# Patient Record
Sex: MALE | Race: WHITE | Employment: FULL TIME | URBAN - METROPOLITAN AREA
[De-identification: names, ages, dates, MRNs, and addresses within clinical notes are randomized per-mention and may not be internally consistent; named-entity substitution may affect disease eponyms.]

---

## 2018-01-09 ENCOUNTER — OFFICE VISIT (OUTPATIENT)
Dept: FAMILY MEDICINE CLINIC | Facility: CLINIC | Age: 38
End: 2018-01-09

## 2018-01-09 VITALS
BODY MASS INDEX: 31.2 KG/M2 | TEMPERATURE: 98 F | WEIGHT: 185 LBS | HEART RATE: 103 BPM | OXYGEN SATURATION: 98 % | HEIGHT: 64.5 IN | SYSTOLIC BLOOD PRESSURE: 116 MMHG | DIASTOLIC BLOOD PRESSURE: 70 MMHG

## 2018-01-09 DIAGNOSIS — J01.10 ACUTE NON-RECURRENT FRONTAL SINUSITIS: Primary | ICD-10-CM

## 2018-01-09 PROCEDURE — 99202 OFFICE O/P NEW SF 15 MIN: CPT

## 2018-01-09 RX ORDER — AMOXICILLIN AND CLAVULANATE POTASSIUM 875; 125 MG/1; MG/1
1 TABLET, FILM COATED ORAL 2 TIMES DAILY
Qty: 20 TABLET | Refills: 0 | Status: SHIPPED | OUTPATIENT
Start: 2018-01-09 | End: 2018-01-19

## 2018-01-09 NOTE — PROGRESS NOTES
HPI:    Patient ID: Jess Paulino is a 42year old male. Cough   This is a new problem. Episode onset: 2 days ago. The problem has been gradually worsening. The problem occurs every few minutes. The cough is productive of sputum.  Associated sympt Hearing, tympanic membrane, external ear and ear canal normal.   Nose: Mucosal edema and rhinorrhea present. Right sinus exhibits frontal sinus tenderness. Right sinus exhibits no maxillary sinus tenderness. Left sinus exhibits frontal sinus tenderness.  [facility]

## 2018-01-10 NOTE — PATIENT INSTRUCTIONS
Sinusitis [Sinusitis, Abx Tx]    Los senos paranasales son cavidades llenas de aire dentro de los huesos de la fausto. Se conectan con la parte interna de essie Downey.  La sinusitis es mady inflamación del tejido que recubre la cavidad del seno paranasal. Jerrod i 5. Puede anthony algún descongestionante sin receta a menos que le hayan recetado algún medicamento similar. Los sprays nasales son los que tienen [address] rápido.  Use alguno que contenga fenilefrina (Joe-Synephrine, Sinex, entre otros) o oximetazolina (Af 8. Puede usar acetaminofén (Tylenol) o ibuprofeno (Motrin o Advil) para controlar el dolor, a menos que le hayan recetado otro medicamento.  [ Aime Morejon: Si tiene mady enfermedad hepática o renal crónica, o ha tenido alguna vez mady úlcera estomacal, consulte con

## 2018-02-06 ENCOUNTER — OFFICE VISIT (OUTPATIENT)
Dept: FAMILY MEDICINE CLINIC | Facility: CLINIC | Age: 38
End: 2018-02-06

## 2018-02-06 VITALS
HEART RATE: 83 BPM | BODY MASS INDEX: 32.72 KG/M2 | WEIGHT: 187 LBS | OXYGEN SATURATION: 99 % | HEIGHT: 63.5 IN | SYSTOLIC BLOOD PRESSURE: 120 MMHG | DIASTOLIC BLOOD PRESSURE: 80 MMHG

## 2018-02-06 DIAGNOSIS — Z00.01 ENCOUNTER FOR ROUTINE ADULT HEALTH EXAMINATION WITH ABNORMAL FINDINGS: Primary | ICD-10-CM

## 2018-02-06 DIAGNOSIS — E66.09 NON MORBID OBESITY DUE TO EXCESS CALORIES: ICD-10-CM

## 2018-02-06 DIAGNOSIS — Z13.31 DEPRESSION SCREENING: ICD-10-CM

## 2018-02-06 PROBLEM — J01.10 ACUTE NON-RECURRENT FRONTAL SINUSITIS: Status: RESOLVED | Noted: 2018-01-09 | Resolved: 2018-02-06

## 2018-02-06 PROCEDURE — 99395 PREV VISIT EST AGE 18-39: CPT

## 2018-02-07 ENCOUNTER — NURSE ONLY (OUTPATIENT)
Dept: FAMILY MEDICINE CLINIC | Facility: CLINIC | Age: 38
End: 2018-02-07

## 2018-02-07 DIAGNOSIS — Z00.01 ENCOUNTER FOR ROUTINE ADULT HEALTH EXAMINATION WITH ABNORMAL FINDINGS: ICD-10-CM

## 2018-02-07 DIAGNOSIS — E78.2 MIXED HYPERLIPIDEMIA: Primary | ICD-10-CM

## 2018-02-07 LAB
ALBUMIN SERPL BCP-MCNC: 4.4 G/DL (ref 3.5–4.8)
ALBUMIN/GLOB SERPL: 1.6 {RATIO} (ref 1–2)
ALP SERPL-CCNC: 58 U/L (ref 32–100)
ALT SERPL-CCNC: 44 U/L (ref 17–63)
ANION GAP SERPL CALC-SCNC: 9 MMOL/L (ref 0–18)
AST SERPL-CCNC: 28 U/L (ref 15–41)
BILIRUB SERPL-MCNC: 0.5 MG/DL (ref 0.3–1.2)
BUN SERPL-MCNC: 15 MG/DL (ref 8–20)
BUN/CREAT SERPL: 16.5 (ref 10–20)
CALCIUM SERPL-MCNC: 9.3 MG/DL (ref 8.5–10.5)
CHLORIDE SERPL-SCNC: 104 MMOL/L (ref 95–110)
CHOLEST SERPL-MCNC: 216 MG/DL (ref 110–200)
CO2 SERPL-SCNC: 27 MMOL/L (ref 22–32)
CREAT SERPL-MCNC: 0.91 MG/DL (ref 0.5–1.5)
ERYTHROCYTE [DISTWIDTH] IN BLOOD BY AUTOMATED COUNT: 13.2 % (ref 11–15)
GLOBULIN PLAS-MCNC: 2.7 G/DL (ref 2.5–3.7)
GLUCOSE SERPL-MCNC: 84 MG/DL (ref 70–99)
HCT VFR BLD AUTO: 46.4 % (ref 41–52)
HDLC SERPL-MCNC: 45 MG/DL
HGB BLD-MCNC: 15.4 G/DL (ref 13.5–17.5)
LDLC SERPL CALC-MCNC: 121 MG/DL (ref 0–99)
MCH RBC QN AUTO: 30.6 PG (ref 27–32)
MCHC RBC AUTO-ENTMCNC: 33.2 G/DL (ref 32–37)
MCV RBC AUTO: 92 FL (ref 80–100)
NONHDLC SERPL-MCNC: 171 MG/DL
OSMOLALITY UR CALC.SUM OF ELEC: 290 MOSM/KG (ref 275–295)
PATIENT FASTING: YES
PLATELET # BLD AUTO: 225 K/UL (ref 140–400)
PMV BLD AUTO: 9.8 FL (ref 7.4–10.3)
POTASSIUM SERPL-SCNC: 4.3 MMOL/L (ref 3.3–5.1)
PROT SERPL-MCNC: 7.1 G/DL (ref 5.9–8.4)
RBC # BLD AUTO: 5.05 M/UL (ref 4.5–5.9)
SODIUM SERPL-SCNC: 140 MMOL/L (ref 136–144)
TRIGL SERPL-MCNC: 250 MG/DL (ref 1–149)
WBC # BLD AUTO: 4.2 K/UL (ref 4–11)

## 2018-02-07 PROCEDURE — 85027 COMPLETE CBC AUTOMATED: CPT

## 2018-02-07 PROCEDURE — 80053 COMPREHEN METABOLIC PANEL: CPT

## 2018-02-07 PROCEDURE — 80061 LIPID PANEL: CPT

## 2018-02-07 PROCEDURE — 36415 COLL VENOUS BLD VENIPUNCTURE: CPT

## 2018-02-07 RX ORDER — SIMVASTATIN 20 MG
20 TABLET ORAL NIGHTLY
Qty: 90 TABLET | Refills: 0 | Status: SHIPPED | OUTPATIENT
Start: 2018-02-07 | End: 2018-05-22

## 2018-02-07 NOTE — PROGRESS NOTES
CC: Annual Physical Exam     HPI:   Derick Chacon is a 42year old male who presents for a complete physical exam. Pt denies any acute complaints at this time.     Wt Readings from Last 6 Encounters:  02/06/18 : 187 lb  01/09/18 : 185 lb    Body mass bleeding or bruising. LYMPHATICS:  Denies enlarged nodes or history of splenectomy. PSYCHIATRIC:  Denies depression or anxiety. ENDOCRINOLOGIC:  Denies excessive sweating, cold or heat intolerance, polyuria or polydipsia.   ALLERGIES:  Denies allergic re check:   Orders Placed This Encounter      CBC, Platelet, No Differential [E]      Comp Metabolic Panel (14) [E]      Lipid Panel [E]      Urinalysis, Routine [E][If pt <2 yrs old, must also order Reducing Substance ([id number])]    1.  Encounter for routine a

## 2018-02-07 NOTE — PATIENT INSTRUCTIONS
Pautas de prevención, hombres de entre 18 y 44 años de edad  Las pruebas de detección y las vacunas son importantes para el manejo de whelan andrews.  La consejería sobre whelan andrews también es esencial. A continuación, verá pautas en relación con esos temas para [employer] hombres de neva ajith de edad Cada anmol a bert años si no existen factores de riesgo que predispongan a tener enfermedades de los ojos   Assunta Fray   Varicela Todos los hombres de neva ajith de edad que no tienen registro d PCV13: mady dosis [facility] 19 y 72 años de edad (protege contra 13 tipos de bacteria neumocócica)    PPSV23: Cheryle Lineman a dos dosis [employer] 59 años de [place], o mady dosis a partir de los 65 años (protege contra 23 tipos de bacteria neumocócica)   Refuerzo de téta

## 2018-02-08 ENCOUNTER — TELEPHONE (OUTPATIENT)
Dept: FAMILY MEDICINE CLINIC | Facility: CLINIC | Age: 38
End: 2018-02-08

## 2018-02-08 NOTE — TELEPHONE ENCOUNTER
----- Message from Katarzyna Chandler MD sent at 2/7/2018 10:09 PM CST -----  Start Simvastatin 20mg PO QHS, medication e-prescribed; needs repeat BW (CMP, lipid panel) in 3 months prior to next f/u appointment; ok to file.

## 2018-02-08 NOTE — TELEPHONE ENCOUNTER
Patient's wife got informed about his lab results and will let Edenilson Millard know about them and made her aware he has to return in 3 months to repeat labs.

## 2018-05-16 ENCOUNTER — NURSE ONLY (OUTPATIENT)
Dept: FAMILY MEDICINE CLINIC | Facility: CLINIC | Age: 38
End: 2018-05-16

## 2018-05-16 DIAGNOSIS — E78.2 MIXED HYPERLIPIDEMIA: ICD-10-CM

## 2018-05-16 PROCEDURE — 80061 LIPID PANEL: CPT

## 2018-05-16 PROCEDURE — 36415 COLL VENOUS BLD VENIPUNCTURE: CPT

## 2018-05-16 PROCEDURE — 80053 COMPREHEN METABOLIC PANEL: CPT

## 2018-05-16 NOTE — PROGRESS NOTES
Patient presented to clinic for lipid blood work. Orders verified in patient chart. Name and  verified. Patient is fasting. Blood drawn from the left antecubital, tolerated well without complications.

## 2018-05-22 ENCOUNTER — OFFICE VISIT (OUTPATIENT)
Dept: FAMILY MEDICINE CLINIC | Facility: CLINIC | Age: 38
End: 2018-05-22

## 2018-05-22 VITALS
HEART RATE: 71 BPM | WEIGHT: 184 LBS | OXYGEN SATURATION: 98 % | BODY MASS INDEX: 32 KG/M2 | SYSTOLIC BLOOD PRESSURE: 108 MMHG | DIASTOLIC BLOOD PRESSURE: 80 MMHG

## 2018-05-22 DIAGNOSIS — E78.2 MIXED HYPERLIPIDEMIA: Primary | ICD-10-CM

## 2018-05-22 PROBLEM — Z00.01 ENCOUNTER FOR ROUTINE ADULT HEALTH EXAMINATION WITH ABNORMAL FINDINGS: Status: RESOLVED | Noted: 2018-02-06 | Resolved: 2018-05-22

## 2018-05-22 PROCEDURE — 99213 OFFICE O/P EST LOW 20 MIN: CPT

## 2018-05-22 RX ORDER — SIMVASTATIN 40 MG
40 TABLET ORAL NIGHTLY
Qty: 90 TABLET | Refills: 0 | Status: SHIPPED | OUTPATIENT
Start: 2018-05-22 | End: 2018-07-16

## 2018-05-22 NOTE — PROGRESS NOTES
HPI:    Patient ID: Ishan Stone is a 42year old male. Hyperlipidemia   This is a chronic problem. Episode onset: few months ago. The problem is uncontrolled. Exacerbating diseases include obesity.  Factors aggravating his hyperlipidemia include oriented to person, place, and time. Skin: Skin is warm. No rash noted. Nursing note and vitals reviewed. ASSESSMENT/PLAN:   1. Mixed hyperlipidemia  Pt reassured and all questions answered.   Clinical course, prognosis, and treatment option

## 2018-05-22 NOTE — PATIENT INSTRUCTIONS
Colesterol Elevado [High Cholesterol]  La grasa de los alimentos y el colesterol no son la misma cosa. Es importante entender cómo la grasa de los alimentos afecta los niveles de [place].   El cuerpo necesita colesterol para producir células nuevas y c · [facility] que son las grasas saturadas y las grasas trans. Estas aumentan el riesgo de enfermedad. Éstas disminuyen el nivel del colesterol [facility] y [place] el nivel del colesterol [facility].  Las grasas malas se encuentran en las [facility] p 4. Un dietista certificado puede ser útil para enseñarle de qué modo planear las comidas y cómo ajustar whelan alimentación para seguir neva tipo de dieta. Puede pedirle a whelan médico que lo remita.   5. Aumente gradualmente whelan ejercicio diario [employer] © 7302-9876 [address]. [address] [facility] [place] [facility], [facility] [facility]. Todos los derechos reservados. Esta información no pretende sustituir la atención médica profesional. Sólo whelan médico puede diagnosticar y tratar un problema de andrews.

## 2018-07-16 ENCOUNTER — OFFICE VISIT (OUTPATIENT)
Dept: FAMILY MEDICINE CLINIC | Facility: CLINIC | Age: 38
End: 2018-07-16

## 2018-07-16 VITALS
OXYGEN SATURATION: 98 % | DIASTOLIC BLOOD PRESSURE: 80 MMHG | HEART RATE: 60 BPM | WEIGHT: 185 LBS | SYSTOLIC BLOOD PRESSURE: 108 MMHG | BODY MASS INDEX: 32 KG/M2

## 2018-07-16 DIAGNOSIS — R10.10 PAIN OF UPPER ABDOMEN: Primary | ICD-10-CM

## 2018-07-16 DIAGNOSIS — E78.2 MIXED HYPERLIPIDEMIA: ICD-10-CM

## 2018-07-16 DIAGNOSIS — L30.9 DERMATITIS: ICD-10-CM

## 2018-07-16 PROCEDURE — 99213 OFFICE O/P EST LOW 20 MIN: CPT

## 2018-07-16 RX ORDER — ATORVASTATIN CALCIUM 20 MG/1
20 TABLET, FILM COATED ORAL NIGHTLY
Qty: 90 TABLET | Refills: 0 | Status: SHIPPED | OUTPATIENT
Start: 2018-07-16 | End: 2019-08-20

## 2018-07-16 RX ORDER — TRIAMCINOLONE ACETONIDE 0.25 MG/G
1 CREAM TOPICAL 2 TIMES DAILY
Qty: 30 G | Refills: 1 | Status: SHIPPED | OUTPATIENT
Start: 2018-07-16 | End: 2019-08-20 | Stop reason: ALTCHOICE

## 2018-07-17 NOTE — PROGRESS NOTES
HPI:    Patient ID: Martita Egan is a 34year old male. Abdominal Pain   This is a new problem. Episode onset: ~2 months ago since starting to take higher dose of Simvastatin. The onset quality is sudden. The problem occurs daily.  The problem ha Constitutional: He is oriented to person, place, and time. He appears well-developed and well-nourished. No distress. Cardiovascular: Normal rate, regular rhythm and normal heart sounds.     Pulmonary/Chest: Effort normal and breath sounds normal. No re

## 2018-07-17 NOTE — PATIENT INSTRUCTIONS
Medicamentos para el colesterol  Whelan proveedor de [employer] ha recetado medicamentos para ayudarlo a controlar el colesterol.  En esta hoja se explica cómo el colesterol afecta a whelan andrews y de qué forma los medicamentos pueden ayudarlo a mejorar s Usted puede determinar cuál es whelan nivel de lípidos mediante un análisis de [place]. Un nivel alto de colesterol en gauri es un factor de riesgo para desarrollar mady enfermedad cardiovascular aterosclerótica.  Hable con el proveedor de [employer] para d · Resinas (llamadas también secuestradores de ácidos biliares). Las resinas promueven un aumento de la eliminación del colesterol a través del intestino.  Funcionan uniéndose con la bilis (mady sustancia que ayuda al organismo a digerir la comida). Normalmen · Inhibidores de proproteína convertasa subtilisina kexina tipo 9 (PCSK9). Estos medicamentos reducen los niveles de colesterol LDL descomponiendo las áreas del hígado que controlan la produccción de colesterol LDL.  Estos medicamentos se administran median Planifique realizarse controles de colesterol con regularidad. Es posible que tenga que ayunar antes de hacerse estos controles. Marylen Maizes a whelan proveedor de [employer] cualquier efecto secundrio que puedan [facility].  Heber Chaves · Sepa cómo lidiar con los efectos secundarios.  Muchas personas, cuando comienzan a [employer], tienen efectos secundarios erika dolor de [place] y trastornos estomacales. Normalmente estos problemas desaparecen a las pocas semanas.  Informe a whelan · Limitar las veces que come en restaurantes o en locales de comida rápida  Actividad física  Whelan proveedor de atención médica podría recomendarle que aumente whelan actividad física si no mehta Jacobo-Kin.  Según cuál sea whelan situación, whelan proveedor podría r · Respiración profunda. Tómese unos minutos varias veces al día solo para sentarse tranquilo y respirar. Concéntrese en el aire que entre y sale de whelan cuerpo.   · Hablar con leanne seres queridos. Tómese unos minutos cada día para conectarse con las personas qu

## 2018-10-16 ENCOUNTER — TELEPHONE (OUTPATIENT)
Dept: FAMILY MEDICINE CLINIC | Facility: CLINIC | Age: 38
End: 2018-10-16

## 2019-08-20 ENCOUNTER — OFFICE VISIT (OUTPATIENT)
Dept: FAMILY MEDICINE CLINIC | Facility: CLINIC | Age: 39
End: 2019-08-20
Payer: COMMERCIAL

## 2019-08-20 VITALS
WEIGHT: 189 LBS | SYSTOLIC BLOOD PRESSURE: 104 MMHG | OXYGEN SATURATION: 96 % | BODY MASS INDEX: 33.07 KG/M2 | DIASTOLIC BLOOD PRESSURE: 70 MMHG | HEIGHT: 63.5 IN | HEART RATE: 74 BPM

## 2019-08-20 DIAGNOSIS — L30.9 DERMATITIS: ICD-10-CM

## 2019-08-20 DIAGNOSIS — M54.42 ACUTE LEFT-SIDED LOW BACK PAIN WITH LEFT-SIDED SCIATICA: Primary | ICD-10-CM

## 2019-08-20 PROBLEM — E66.09 CLASS 1 OBESITY DUE TO EXCESS CALORIES WITH SERIOUS COMORBIDITY AND BODY MASS INDEX (BMI) OF 32.0 TO 32.9 IN ADULT: Status: ACTIVE | Noted: 2018-02-06

## 2019-08-20 PROBLEM — Z13.31 DEPRESSION SCREENING: Status: RESOLVED | Noted: 2018-02-06 | Resolved: 2019-08-20

## 2019-08-20 PROCEDURE — 99213 OFFICE O/P EST LOW 20 MIN: CPT

## 2019-08-20 RX ORDER — CLOTRIMAZOLE AND BETAMETHASONE DIPROPIONATE 10; .64 MG/G; MG/G
1 CREAM TOPICAL 2 TIMES DAILY
Qty: 45 G | Refills: 1 | Status: SHIPPED | OUTPATIENT
Start: 2019-08-20 | End: 2020-07-20 | Stop reason: ALTCHOICE

## 2019-08-20 RX ORDER — METHYLPREDNISOLONE 4 MG/1
TABLET ORAL
Qty: 1 KIT | Refills: 0 | Status: SHIPPED | OUTPATIENT
Start: 2019-08-20 | End: 2019-12-26 | Stop reason: ALTCHOICE

## 2019-08-20 RX ORDER — NAPROXEN 500 MG/1
500 TABLET ORAL 2 TIMES DAILY WITH MEALS
Qty: 60 TABLET | Refills: 0 | Status: SHIPPED | OUTPATIENT
Start: 2019-08-20 | End: 2019-12-26 | Stop reason: ALTCHOICE

## 2019-08-20 RX ORDER — CYCLOBENZAPRINE HCL 10 MG
10 TABLET ORAL 3 TIMES DAILY PRN
Qty: 15 TABLET | Refills: 0 | Status: SHIPPED | OUTPATIENT
Start: 2019-08-20 | End: 2019-12-26 | Stop reason: ALTCHOICE

## 2019-08-20 NOTE — PROGRESS NOTES
HPI:    Patient ID: Jarett Aguilar is a 37year old male. Numbness   This is a new problem. Episode onset: 2 months ago. The problem occurs daily. The problem has been waxing and waning. Associated symptoms include myalgias and numbness.  Pertinent needed for Muscle spasms. Disp: 15 tablet Rfl: 0   clotrimazole-betamethasone 1-0.05 % External Cream Apply 1 Application topically 2 (two) times daily.  Disp: 45 g Rfl: 1     Allergies:No Known Allergies   PHYSICAL EXAM:   Physical Exam   Constitutional: H

## 2019-08-21 NOTE — PATIENT INSTRUCTIONS
Cuidado personal para el dolor en la espalda baja    Muchas personas tienen dolor en la espalda baja de vez en cuando. En muchos casos, no es un problema grave y el cuidado personal puede ayudar.  A veces, el dolor en la espalda baja puede ser un signo de · Cuando levante objetos, manténgalos cerca del cuerpo. Levante los objetos pesados con las piernas y no con la espalda. No trate de levantar más peso del que pueda. · Cuando se siente, mantenga la espalda baja apoyada sobre un respaldo.  Utilice mady toall

## 2019-12-26 ENCOUNTER — OFFICE VISIT (OUTPATIENT)
Dept: INTERNAL MEDICINE CLINIC | Facility: CLINIC | Age: 39
End: 2019-12-26
Payer: COMMERCIAL

## 2019-12-26 VITALS
SYSTOLIC BLOOD PRESSURE: 120 MMHG | HEART RATE: 67 BPM | OXYGEN SATURATION: 98 % | HEIGHT: 63.5 IN | RESPIRATION RATE: 17 BRPM | WEIGHT: 192.19 LBS | TEMPERATURE: 99 F | DIASTOLIC BLOOD PRESSURE: 82 MMHG | BODY MASS INDEX: 33.63 KG/M2

## 2019-12-26 DIAGNOSIS — Z00.00 ANNUAL PHYSICAL EXAM: ICD-10-CM

## 2019-12-26 DIAGNOSIS — M51.16 LUMBAR DISC DISEASE WITH RADICULOPATHY: Primary | ICD-10-CM

## 2019-12-26 DIAGNOSIS — E78.2 MIXED HYPERLIPIDEMIA: ICD-10-CM

## 2019-12-26 PROCEDURE — 99203 OFFICE O/P NEW LOW 30 MIN: CPT | Performed by: FAMILY MEDICINE

## 2019-12-26 RX ORDER — ATORVASTATIN CALCIUM 10 MG/1
10 TABLET, FILM COATED ORAL NIGHTLY
Qty: 90 TABLET | Refills: 3 | Status: SHIPPED | OUTPATIENT
Start: 2019-12-26 | End: 2020-03-05 | Stop reason: ALTCHOICE

## 2019-12-26 RX ORDER — DICLOFENAC SODIUM AND MISOPROSTOL 50; 200 MG/1; UG/1
1 TABLET, DELAYED RELEASE ORAL 2 TIMES DAILY PRN
Qty: 60 TABLET | Refills: 2 | Status: SHIPPED | OUTPATIENT
Start: 2019-12-26 | End: 2020-07-20

## 2019-12-26 RX ORDER — GABAPENTIN 300 MG/1
300 CAPSULE ORAL NIGHTLY
Qty: 90 CAPSULE | Refills: 0 | Status: SHIPPED | OUTPATIENT
Start: 2019-12-26 | End: 2020-07-20

## 2019-12-28 PROBLEM — M51.16 LUMBAR DISC DISEASE WITH RADICULOPATHY: Status: ACTIVE | Noted: 2019-12-28

## 2019-12-28 NOTE — PROGRESS NOTES
CC:  Leg Swelling (c/o lower back pain. (MRI) inflammed disk. )      Hx of CC:  H/O CHRONIC LOW BACK PAIN RADIATING TO LEFT BUTTOCKS/LEG AT TIMES X 5+ YEARS. SYMPTOMS FLUCTUATE IN INTENSITY.   AS PER FORMER MRI (BRIGHT LIGHT IMAGING), 2 HERNIATED LUMBAR DI Future  - LIPID PANEL;  Future    XR LUMBAR SPINE (MIN 2 VIEWS) (CPT=72100)  Orders Placed This Encounter      Comp Metabolic Panel (14) [E]          Standing Status: Future          Standing Expiration Date: 12/26/2020      Lipid Panel [E]          Standin

## 2020-02-05 ENCOUNTER — HOSPITAL ENCOUNTER (OUTPATIENT)
Dept: GENERAL RADIOLOGY | Facility: HOSPITAL | Age: 40
Discharge: HOME | End: 2020-02-05
Attending: FAMILY MEDICINE
Payer: COMMERCIAL

## 2020-02-05 DIAGNOSIS — M51.16 LUMBAR DISC DISEASE WITH RADICULOPATHY: ICD-10-CM

## 2020-02-05 PROCEDURE — 72110 X-RAY EXAM L-2 SPINE 4/>VWS: CPT | Performed by: FAMILY MEDICINE

## 2020-03-04 ENCOUNTER — NURSE ONLY (OUTPATIENT)
Dept: INTERNAL MEDICINE CLINIC | Facility: CLINIC | Age: 40
End: 2020-03-04
Payer: COMMERCIAL

## 2020-03-04 DIAGNOSIS — E78.2 MIXED HYPERLIPIDEMIA: ICD-10-CM

## 2020-03-04 DIAGNOSIS — Z00.00 ANNUAL PHYSICAL EXAM: ICD-10-CM

## 2020-03-04 LAB
ALBUMIN SERPL-MCNC: 4.3 G/DL (ref 3.4–5)
ALBUMIN/GLOB SERPL: 1.2 {RATIO} (ref 1–2)
ALP LIVER SERPL-CCNC: 80 U/L (ref 45–117)
ALT SERPL-CCNC: 67 U/L (ref 16–61)
ANION GAP SERPL CALC-SCNC: 8 MMOL/L (ref 0–18)
AST SERPL-CCNC: 24 U/L (ref 15–37)
BILIRUB SERPL-MCNC: 0.5 MG/DL (ref 0.1–2)
BUN BLD-MCNC: 20 MG/DL (ref 7–18)
BUN/CREAT SERPL: 20 (ref 10–20)
CALCIUM BLD-MCNC: 9.6 MG/DL (ref 8.5–10.1)
CHLORIDE SERPL-SCNC: 107 MMOL/L (ref 98–112)
CHOLEST SMN-MCNC: 248 MG/DL (ref ?–200)
CO2 SERPL-SCNC: 25 MMOL/L (ref 21–32)
CREAT BLD-MCNC: 1 MG/DL (ref 0.7–1.3)
GLOBULIN PLAS-MCNC: 3.7 G/DL (ref 2.8–4.4)
GLUCOSE BLD-MCNC: 83 MG/DL (ref 70–99)
HDLC SERPL-MCNC: 48 MG/DL (ref 40–59)
LDLC SERPL CALC-MCNC: 155 MG/DL (ref ?–100)
M PROTEIN MFR SERPL ELPH: 8 G/DL (ref 6.4–8.2)
NONHDLC SERPL-MCNC: 200 MG/DL (ref ?–130)
OSMOLALITY SERPL CALC.SUM OF ELEC: 292 MOSM/KG (ref 275–295)
PATIENT FASTING Y/N/NP: YES
PATIENT FASTING Y/N/NP: YES
POTASSIUM SERPL-SCNC: 4.2 MMOL/L (ref 3.5–5.1)
SODIUM SERPL-SCNC: 140 MMOL/L (ref 136–145)
TRIGL SERPL-MCNC: 223 MG/DL (ref 30–149)
VLDLC SERPL CALC-MCNC: 45 MG/DL (ref 0–30)

## 2020-03-04 PROCEDURE — 36415 COLL VENOUS BLD VENIPUNCTURE: CPT | Performed by: FAMILY MEDICINE

## 2020-03-04 PROCEDURE — 80053 COMPREHEN METABOLIC PANEL: CPT | Performed by: FAMILY MEDICINE

## 2020-03-04 PROCEDURE — 80061 LIPID PANEL: CPT | Performed by: FAMILY MEDICINE

## 2020-03-05 DIAGNOSIS — E78.2 MIXED HYPERLIPIDEMIA: Primary | ICD-10-CM

## 2020-03-05 RX ORDER — ROSUVASTATIN CALCIUM 10 MG/1
10 TABLET, COATED ORAL NIGHTLY
Qty: 90 TABLET | Refills: 3 | Status: SHIPPED | OUTPATIENT
Start: 2020-03-05 | End: 2020-09-28

## 2020-07-20 ENCOUNTER — OFFICE VISIT (OUTPATIENT)
Dept: INTERNAL MEDICINE CLINIC | Facility: CLINIC | Age: 40
End: 2020-07-20
Payer: COMMERCIAL

## 2020-07-20 VITALS
OXYGEN SATURATION: 99 % | BODY MASS INDEX: 33.49 KG/M2 | DIASTOLIC BLOOD PRESSURE: 72 MMHG | HEIGHT: 63.5 IN | TEMPERATURE: 97 F | WEIGHT: 191.38 LBS | SYSTOLIC BLOOD PRESSURE: 110 MMHG | HEART RATE: 83 BPM

## 2020-07-20 DIAGNOSIS — E78.2 MIXED HYPERLIPIDEMIA: ICD-10-CM

## 2020-07-20 DIAGNOSIS — G89.29 CHRONIC LEFT-SIDED LOW BACK PAIN WITH LEFT-SIDED SCIATICA: ICD-10-CM

## 2020-07-20 DIAGNOSIS — L20.89 OTHER ATOPIC DERMATITIS: ICD-10-CM

## 2020-07-20 DIAGNOSIS — M51.16 LUMBAR DISC DISEASE WITH RADICULOPATHY: Primary | ICD-10-CM

## 2020-07-20 DIAGNOSIS — M54.42 CHRONIC LEFT-SIDED LOW BACK PAIN WITH LEFT-SIDED SCIATICA: ICD-10-CM

## 2020-07-20 PROCEDURE — 99214 OFFICE O/P EST MOD 30 MIN: CPT | Performed by: FAMILY MEDICINE

## 2020-07-20 PROCEDURE — 3008F BODY MASS INDEX DOCD: CPT | Performed by: FAMILY MEDICINE

## 2020-07-20 PROCEDURE — 3078F DIAST BP <80 MM HG: CPT | Performed by: FAMILY MEDICINE

## 2020-07-20 PROCEDURE — 3074F SYST BP LT 130 MM HG: CPT | Performed by: FAMILY MEDICINE

## 2020-07-20 RX ORDER — GABAPENTIN 300 MG/1
300 CAPSULE ORAL NIGHTLY
Qty: 90 CAPSULE | Refills: 3 | Status: SHIPPED | OUTPATIENT
Start: 2020-07-20 | End: 2020-09-09 | Stop reason: ALTCHOICE

## 2020-07-20 RX ORDER — TOBRAMYCIN AND DEXAMETHASONE 3; 1 MG/ML; MG/ML
SUSPENSION/ DROPS OPHTHALMIC
COMMUNITY
Start: 2020-05-21 | End: 2020-09-09

## 2020-07-20 RX ORDER — DICLOFENAC SODIUM AND MISOPROSTOL 50; 200 MG/1; UG/1
1 TABLET, DELAYED RELEASE ORAL 2 TIMES DAILY PRN
Qty: 180 TABLET | Refills: 1 | Status: SHIPPED | OUTPATIENT
Start: 2020-07-20 | End: 2020-09-09

## 2020-07-20 RX ORDER — FLUOCINOLONE ACETONIDE 0.25 MG/G
1 CREAM TOPICAL 2 TIMES DAILY
Qty: 60 G | Refills: 5 | Status: SHIPPED | OUTPATIENT
Start: 2020-07-20 | End: 2020-09-09

## 2020-07-20 NOTE — PROGRESS NOTES
CC:  Follow - Up (medication follow up )      Hx of CC:  CHRONIC FLUCTUATING LOW BACK AND LEFT BUTTOCK/LEG PAIN X 5-6 YEARS. HAD MRI AND BRIGHTLIGHT IMAGING YEARS AGO WHICH SHOWED HERNIATED DISC-->WENT TO PHYSICAL THERAPY.   STILL SYMPTOMATIC AND INTERESTE (CPT=72148); Future  - MRI SPINE LUMBAR (CPT=72148)  - Diclofenac-miSOPROStol 50-0.2 MG Oral Tab EC; Take 1 tablet by mouth 2 (two) times daily as needed (pain). Dispense: 180 tablet; Refill: 1  - gabapentin 300 MG Oral Cap;  Take 1 capsule (300 mg total)

## 2020-09-05 ENCOUNTER — APPOINTMENT (OUTPATIENT)
Dept: GENERAL RADIOLOGY | Age: 40
End: 2020-09-05
Attending: EMERGENCY MEDICINE
Payer: OTHER MISCELLANEOUS

## 2020-09-05 ENCOUNTER — HOSPITAL ENCOUNTER (OUTPATIENT)
Age: 40
Discharge: HOME | End: 2020-09-05
Attending: EMERGENCY MEDICINE
Payer: OTHER MISCELLANEOUS

## 2020-09-05 VITALS
RESPIRATION RATE: 18 BRPM | BODY MASS INDEX: 35 KG/M2 | DIASTOLIC BLOOD PRESSURE: 85 MMHG | OXYGEN SATURATION: 99 % | TEMPERATURE: 98 F | WEIGHT: 198 LBS | HEART RATE: 77 BPM | SYSTOLIC BLOOD PRESSURE: 137 MMHG

## 2020-09-05 DIAGNOSIS — M54.40 BACK PAIN OF LUMBAR REGION WITH SCIATICA: Primary | ICD-10-CM

## 2020-09-05 PROCEDURE — 72100 X-RAY EXAM L-S SPINE 2/3 VWS: CPT | Performed by: EMERGENCY MEDICINE

## 2020-09-05 PROCEDURE — 99214 OFFICE O/P EST MOD 30 MIN: CPT | Performed by: EMERGENCY MEDICINE

## 2020-09-05 RX ORDER — NAPROXEN 500 MG/1
500 TABLET ORAL 2 TIMES DAILY WITH MEALS
Qty: 20 TABLET | Refills: 0 | Status: SHIPPED | OUTPATIENT
Start: 2020-09-05 | End: 2020-09-15

## 2020-09-05 RX ORDER — CYCLOBENZAPRINE HCL 10 MG
10 TABLET ORAL NIGHTLY
Qty: 7 TABLET | Refills: 0 | Status: SHIPPED | OUTPATIENT
Start: 2020-09-05 | End: 2020-09-12

## 2020-09-05 NOTE — ED PROVIDER NOTES
Patient Seen in: [facility] In [address] [place] [address]    History   Patient presents with:  Back Pain    Stated Complaint: injured back at work    HPI    Chief complaint: Back pain    History of present illness:   The patient complains of back pain, HPI.  Constitutional and vital signs reviewed. All other systems reviewed and negative except as noted above. PSFH elements reviewed from today and agreed except as otherwise stated in HPI.     Physical Exam     ED Triage Vitals [09/05/20 1359]   BP mg total) by mouth 2 (two) times daily with meals for 10 days. Qty: 20 tablet Refills: 0    cyclobenzaprine 10 MG Oral Tab  Take 1 tablet (10 mg total) by mouth nightly for 7 days.   Qty: 7 tablet Refills: 0          Present on Admission:  **None**

## 2020-09-08 ENCOUNTER — TELEPHONE (OUTPATIENT)
Dept: INTERNAL MEDICINE CLINIC | Facility: CLINIC | Age: 40
End: 2020-09-08

## 2020-09-08 NOTE — TELEPHONE ENCOUNTER
Patient wants to know if Dr. Stephanie Grissom can call him back, he went to immediate care on 09/05/2020 but mention he is in a lot of pain on left leg, please advice.

## 2020-09-09 ENCOUNTER — APPOINTMENT (OUTPATIENT)
Dept: OCCUPATIONAL MEDICINE | Age: 40
End: 2020-09-09
Attending: ORTHOPAEDIC SURGERY
Payer: OTHER MISCELLANEOUS

## 2020-09-09 ENCOUNTER — OFFICE VISIT (OUTPATIENT)
Dept: NEUROLOGY | Facility: CLINIC | Age: 40
End: 2020-09-09

## 2020-09-09 VITALS — BODY MASS INDEX: 34.12 KG/M2 | HEIGHT: 63.5 IN | WEIGHT: 195 LBS

## 2020-09-09 DIAGNOSIS — M54.16 LUMBAR RADICULOPATHY: Primary | ICD-10-CM

## 2020-09-09 DIAGNOSIS — G47.00 INSOMNIA, UNSPECIFIED TYPE: ICD-10-CM

## 2020-09-09 PROCEDURE — 3008F BODY MASS INDEX DOCD: CPT | Performed by: PHYSICAL MEDICINE & REHABILITATION

## 2020-09-09 PROCEDURE — 99244 OFF/OP CNSLTJ NEW/EST MOD 40: CPT | Performed by: PHYSICAL MEDICINE & REHABILITATION

## 2020-09-09 RX ORDER — TRAMADOL HYDROCHLORIDE 50 MG/1
50 TABLET ORAL EVERY 6 HOURS PRN
Qty: 60 TABLET | Refills: 0 | Status: SHIPPED | OUTPATIENT
Start: 2020-09-09 | End: 2020-09-15

## 2020-09-09 RX ORDER — PREDNISONE 10 MG/1
TABLET ORAL
Qty: 28 TABLET | Refills: 0 | Status: SHIPPED | OUTPATIENT
Start: 2020-09-09 | End: 2020-09-15 | Stop reason: ALTCHOICE

## 2020-09-09 RX ORDER — DIAZEPAM 5 MG/1
5 TABLET ORAL NIGHTLY
Qty: 7 TABLET | Refills: 0 | Status: SHIPPED | OUTPATIENT
Start: 2020-09-09 | End: 2020-09-15 | Stop reason: ALTCHOICE

## 2020-09-09 NOTE — PROGRESS NOTES
[address]  Progress Note    CHIEF COMPLAINT:  Patient presents with:  Low Back Pain: New patient referred by [facility] for low back injury at work on Saturday.  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  90 tablet 3   • Omeprazole Magnesium 20 Extra-occular movements intact. Ears: No auricular hematoma or deformities  Heart: peripheral pulses intact. Normal capillary refill. Lungs: Non-labored respirations  Extremities: No lower extremity edema bilaterally   Skin: No lesions noted.    Spine:

## 2020-09-14 ENCOUNTER — TELEPHONE (OUTPATIENT)
Dept: NEUROLOGY | Facility: CLINIC | Age: 40
End: 2020-09-14

## 2020-09-14 NOTE — TELEPHONE ENCOUNTER
Please refill  traMADol HCl 50 MG Oral Tab/  Diazepam 5 MG Oral Tab  AND   PredniSONE 10 MG Oral Tab. Patient has an MRI scheduled for this Friday. Patient also needs a work for note, stating he was in on Sept 9th.  Please call when its ready for pic

## 2020-09-14 NOTE — TELEPHONE ENCOUNTER
Medication request: Prednisone 10mg Take 7 tablets today,take 6 tablets tomorrow, then decrease number of tablets by one tablet each of the remaining days    [place] 09[place]09/20 #28 r-0    Medication request: Diazepam 5mg Take 1 tablet (5 mg total)

## 2020-09-15 RX ORDER — TRAMADOL HYDROCHLORIDE 50 MG/1
50 TABLET ORAL EVERY 6 HOURS PRN
Qty: 60 TABLET | Refills: 0 | Status: SHIPPED | OUTPATIENT
Start: 2020-09-15 | End: 2021-04-01 | Stop reason: ALTCHOICE

## 2020-09-15 RX ORDER — CYCLOBENZAPRINE HCL 10 MG
10 TABLET ORAL NIGHTLY
Qty: 30 TABLET | Refills: 0 | Status: SHIPPED | OUTPATIENT
Start: 2020-09-15 | End: 2020-09-25

## 2020-09-15 NOTE — TELEPHONE ENCOUNTER
A few issues:  1. No more steroids - I agree with your assessment  2. Stop diazepam, replace with flexeril  3. Refill tramadol until 9/25.  4. OK for off work note until 9/25. See note.

## 2020-09-15 NOTE — TELEPHONE ENCOUNTER
Used Lao phone interpretor. Informed patients wife of Dr. Tien Berg message. Pt's wife will  letter at .

## 2020-09-18 ENCOUNTER — HOSPITAL ENCOUNTER (OUTPATIENT)
Dept: MRI IMAGING | Facility: HOSPITAL | Age: 40
Discharge: HOME | End: 2020-09-18
Attending: PHYSICAL MEDICINE & REHABILITATION
Payer: OTHER MISCELLANEOUS

## 2020-09-18 DIAGNOSIS — M54.16 LUMBAR RADICULOPATHY: ICD-10-CM

## 2020-09-18 PROCEDURE — 72148 MRI LUMBAR SPINE W/O DYE: CPT | Performed by: PHYSICAL MEDICINE & REHABILITATION

## 2020-09-21 ENCOUNTER — TELEPHONE (OUTPATIENT)
Dept: NEUROLOGY | Facility: CLINIC | Age: 40
End: 2020-09-21

## 2020-09-21 NOTE — TELEPHONE ENCOUNTER
Used . Mari Saravia of imaging results and that Dr. Ceasar Lucas will discuss further at the appt on the 25th. Rani verbalized understanding.

## 2020-09-21 NOTE — TELEPHONE ENCOUNTER
----- Message from Kike Villaseñor MD sent at 9/21/2020  8:21 AM CDT -----  I personally reviewed a lumbar MRI from September 2020 showing minimal left L4-5 foraminal stenosis and a shallow broad-based disc protrusion at L2-3 without any neurological con

## 2020-09-25 ENCOUNTER — OFFICE VISIT (OUTPATIENT)
Dept: NEUROLOGY | Facility: CLINIC | Age: 40
End: 2020-09-25
Payer: COMMERCIAL

## 2020-09-25 VITALS — HEIGHT: 63.5 IN | WEIGHT: 195 LBS | BODY MASS INDEX: 34.12 KG/M2

## 2020-09-25 DIAGNOSIS — S33.5XXA LUMBAR SPRAIN, INITIAL ENCOUNTER: Primary | ICD-10-CM

## 2020-09-25 DIAGNOSIS — N50.812 PAIN IN LEFT TESTICLE: ICD-10-CM

## 2020-09-25 DIAGNOSIS — G47.00 INSOMNIA, UNSPECIFIED TYPE: ICD-10-CM

## 2020-09-25 PROCEDURE — 99214 OFFICE O/P EST MOD 30 MIN: CPT | Performed by: PHYSICAL MEDICINE & REHABILITATION

## 2020-09-25 PROCEDURE — 3008F BODY MASS INDEX DOCD: CPT | Performed by: PHYSICAL MEDICINE & REHABILITATION

## 2020-09-25 RX ORDER — CYCLOBENZAPRINE HCL 10 MG
20 TABLET ORAL NIGHTLY
Qty: 60 TABLET | Refills: 0 | Status: SHIPPED | OUTPATIENT
Start: 2020-09-25 | End: 2020-10-20

## 2020-09-25 RX ORDER — MELOXICAM 15 MG/1
15 TABLET ORAL DAILY
Qty: 30 TABLET | Refills: 0 | Status: SHIPPED | OUTPATIENT
Start: 2020-09-25 | End: 2020-10-20

## 2020-09-25 NOTE — PROGRESS NOTES
[address]  Progress Note    CHIEF COMPLAINT:  Patient presents with: Follow - Up: Patient present for f/u on 09/09/2020.  Patient states that nothing has changed with his back pain except when he takes the Daughter    • No Known Problems Son        CURRENT MEDICATIONS:   Current Outpatient Medications   Medication Sig Dispense Refill   • Meloxicam 15 MG Oral Tab Take 1 tablet (15 mg total) by mouth daily.  30 tablet 0   • cyclobenzaprine 10 MG Oral Tab Take 2 neg        Data    Radiology Imagin. I reviewed a lumbar plain film x-ray from 2020 which was normal  2.  I personally reviewed a lumbar MRI from 2020 showing minimal left L4-5 foraminal stenosis and a shallow broad-based disc pr Augusto Ochoa

## 2020-09-26 ENCOUNTER — NURSE ONLY (OUTPATIENT)
Dept: INTERNAL MEDICINE CLINIC | Facility: CLINIC | Age: 40
End: 2020-09-26
Payer: COMMERCIAL

## 2020-09-26 DIAGNOSIS — E78.2 MIXED HYPERLIPIDEMIA: ICD-10-CM

## 2020-09-26 PROCEDURE — 80061 LIPID PANEL: CPT | Performed by: FAMILY MEDICINE

## 2020-09-26 PROCEDURE — 80053 COMPREHEN METABOLIC PANEL: CPT | Performed by: FAMILY MEDICINE

## 2020-09-27 PROBLEM — N50.812 PAIN IN LEFT TESTICLE: Status: ACTIVE | Noted: 2020-09-27

## 2020-09-27 PROBLEM — S33.5XXA LUMBAR SPRAIN: Status: ACTIVE | Noted: 2020-09-27

## 2020-09-27 RX ORDER — OMEPRAZOLE 20 MG/1
20 TABLET, DELAYED RELEASE ORAL DAILY
COMMUNITY
End: 2021-04-01 | Stop reason: ALTCHOICE

## 2020-09-28 DIAGNOSIS — E78.2 MIXED HYPERLIPIDEMIA: ICD-10-CM

## 2020-09-28 RX ORDER — ROSUVASTATIN CALCIUM 10 MG/1
10 TABLET, COATED ORAL NIGHTLY
Qty: 90 TABLET | Refills: 3 | Status: SHIPPED | OUTPATIENT
Start: 2020-09-28 | End: 2020-09-28

## 2020-09-28 RX ORDER — ROSUVASTATIN CALCIUM 10 MG/1
10 TABLET, COATED ORAL NIGHTLY
Qty: 90 TABLET | Refills: 3 | Status: SHIPPED | OUTPATIENT
Start: 2020-09-28 | End: 2021-04-01 | Stop reason: ALTCHOICE

## 2020-09-29 ENCOUNTER — TELEPHONE (OUTPATIENT)
Dept: NEUROLOGY | Facility: CLINIC | Age: 40
End: 2020-09-29

## 2020-09-29 NOTE — TELEPHONE ENCOUNTER
Physical therapy- APPROVED  Received a fax from [employer] authorizing 12 PT sessions for pt. Claim # [id number]. Sent determination letter to scanning.

## 2020-09-30 ENCOUNTER — TELEPHONE (OUTPATIENT)
Dept: NEUROLOGY | Facility: CLINIC | Age: 40
End: 2020-09-30

## 2020-09-30 NOTE — TELEPHONE ENCOUNTER
Received fax from [employer]:  Shelby Pearce RN requesting office notes from 9/25/25, MRI of lumbar spine report, and next scheduled appointment. All printed and faxed to [phone].

## 2020-10-19 ENCOUNTER — OFFICE VISIT (OUTPATIENT)
Dept: SURGERY | Facility: CLINIC | Age: 40
End: 2020-10-19
Payer: COMMERCIAL

## 2020-10-19 VITALS
RESPIRATION RATE: 16 BRPM | BODY MASS INDEX: 34.12 KG/M2 | HEART RATE: 84 BPM | SYSTOLIC BLOOD PRESSURE: 126 MMHG | HEIGHT: 63.5 IN | DIASTOLIC BLOOD PRESSURE: 87 MMHG | WEIGHT: 195 LBS | TEMPERATURE: 98 F

## 2020-10-19 DIAGNOSIS — N50.812 LEFT TESTICULAR PAIN: Primary | ICD-10-CM

## 2020-10-19 PROCEDURE — 3074F SYST BP LT 130 MM HG: CPT | Performed by: UROLOGY

## 2020-10-19 PROCEDURE — 99244 OFF/OP CNSLTJ NEW/EST MOD 40: CPT | Performed by: UROLOGY

## 2020-10-19 PROCEDURE — 3008F BODY MASS INDEX DOCD: CPT | Performed by: UROLOGY

## 2020-10-19 PROCEDURE — 3079F DIAST BP 80-89 MM HG: CPT | Performed by: UROLOGY

## 2020-10-19 NOTE — PROGRESS NOTES
[facility]  Initial Office Consultation    HPI:   Elva Raygoza is a 41year old male here today for consultation at the request of, and a copy of this note will be sent to, Michelle Cortez MD. Accompanied by his wife.  He is [place] spe Constitutional: He is oriented to person, place, and time. He appears well-developed and well-nourished. No distress. HENT:   Head: Normocephalic and atraumatic. Eyes: Conjunctivae are normal.   Neck: Neck supple. Cardiovascular: Normal rate.     Pu referred from his back pain. I recommend continued follow-up with PMNR for management of his back pain (PT, injections, etc.. ). In my opinion, no further testing indicated at this point from an urological standpoint.       I am happy to see him back if

## 2020-10-20 ENCOUNTER — OFFICE VISIT (OUTPATIENT)
Dept: NEUROLOGY | Facility: CLINIC | Age: 40
End: 2020-10-20
Payer: COMMERCIAL

## 2020-10-20 VITALS — WEIGHT: 195 LBS | BODY MASS INDEX: 34.12 KG/M2 | HEIGHT: 63.5 IN

## 2020-10-20 DIAGNOSIS — N50.812 PAIN IN LEFT TESTICLE: ICD-10-CM

## 2020-10-20 DIAGNOSIS — G47.00 INSOMNIA, UNSPECIFIED TYPE: ICD-10-CM

## 2020-10-20 DIAGNOSIS — S33.5XXA LUMBAR SPRAIN, INITIAL ENCOUNTER: Primary | ICD-10-CM

## 2020-10-20 PROCEDURE — 99214 OFFICE O/P EST MOD 30 MIN: CPT | Performed by: PHYSICAL MEDICINE & REHABILITATION

## 2020-10-20 PROCEDURE — 3008F BODY MASS INDEX DOCD: CPT | Performed by: PHYSICAL MEDICINE & REHABILITATION

## 2020-10-20 RX ORDER — MELOXICAM 15 MG/1
15 TABLET ORAL DAILY
Qty: 30 TABLET | Refills: 0 | Status: SHIPPED | OUTPATIENT
Start: 2020-10-20 | End: 2020-11-23

## 2020-10-20 RX ORDER — CYCLOBENZAPRINE HCL 10 MG
20 TABLET ORAL NIGHTLY
Qty: 60 TABLET | Refills: 0 | Status: SHIPPED | OUTPATIENT
Start: 2020-10-20 | End: 2020-11-19

## 2020-10-20 NOTE — PROGRESS NOTES
[address]  Progress Note    CHIEF COMPLAINT:  Patient presents with:  Low Back Pain: Patient presents to follow up on low back pain.  LOV 09/25/2020, patient is currently in physical therapy and he states th History   Problem Relation Age of Onset   • Cancer Mother         brain CA   • Diabetes Mother    • No Known Problems Daughter    • No Known Problems Son        CURRENT MEDICATIONS:   Current Outpatient Medications   Medication Sig Dispense Refill   • Radha Swanson have 5/5 strength  Sensation: Normal lower extremities  Reflexes: Normal lower extremities  SLR: neg        Data    Radiology Imagin. I reviewed a lumbar plain film x-ray from 2020 which was normal  2.  I reviewed a lumbar MRI from

## 2020-10-22 ENCOUNTER — TELEPHONE (OUTPATIENT)
Dept: NEUROLOGY | Facility: CLINIC | Age: 40
End: 2020-10-22

## 2020-10-29 ENCOUNTER — TELEPHONE (OUTPATIENT)
Dept: NEUROLOGY | Facility: CLINIC | Age: 40
End: 2020-10-29

## 2020-11-03 ENCOUNTER — OFFICE VISIT (OUTPATIENT)
Dept: NEUROLOGY | Facility: CLINIC | Age: 40
End: 2020-11-03
Payer: COMMERCIAL

## 2020-11-03 ENCOUNTER — MED REC SCAN ONLY (OUTPATIENT)
Dept: NEUROLOGY | Facility: CLINIC | Age: 40
End: 2020-11-03

## 2020-11-03 VITALS — WEIGHT: 195 LBS | HEIGHT: 63.5 IN | BODY MASS INDEX: 34.12 KG/M2

## 2020-11-03 DIAGNOSIS — S33.5XXA LUMBAR SPRAIN, INITIAL ENCOUNTER: Primary | ICD-10-CM

## 2020-11-03 DIAGNOSIS — G47.00 INSOMNIA, UNSPECIFIED TYPE: ICD-10-CM

## 2020-11-03 PROCEDURE — 3008F BODY MASS INDEX DOCD: CPT | Performed by: PHYSICAL MEDICINE & REHABILITATION

## 2020-11-03 PROCEDURE — 99072 ADDL SUPL MATRL&STAF TM PHE: CPT | Performed by: PHYSICAL MEDICINE & REHABILITATION

## 2020-11-03 PROCEDURE — 99214 OFFICE O/P EST MOD 30 MIN: CPT | Performed by: PHYSICAL MEDICINE & REHABILITATION

## 2020-11-03 NOTE — PROGRESS NOTES
[address]  Progress Note    CHIEF COMPLAINT:  Patient presents with:  Low Back Pain: LOV 10/20/20 Pt is following up pt is following up with low back pain.  Pt had an accident 9/5      History of Present Ill Daughter    • No Known Problems Son        CURRENT MEDICATIONS:   Current Outpatient Medications   Medication Sig Dispense Refill   • Meloxicam 15 MG Oral Tab Take 1 tablet (15 mg total) by mouth daily.  30 tablet 0   • cyclobenzaprine 10 MG Oral Tab Take 2 reviewed a lumbar MRI from September 2020 showing minimal left L4-5 foraminal stenosis and a shallow broad-based disc protrusion at L2-3 with an HIZ but without any neurological contact. ASSESSMENT AND PLAN:  1.  Lumbar sprain, initial encounter  He i

## 2020-11-04 ENCOUNTER — MED REC SCAN ONLY (OUTPATIENT)
Dept: NEUROLOGY | Facility: CLINIC | Age: 40
End: 2020-11-04

## 2020-11-11 ENCOUNTER — TELEPHONE (OUTPATIENT)
Dept: NEUROLOGY | Facility: CLINIC | Age: 40
End: 2020-11-11

## 2020-11-11 DIAGNOSIS — S33.5XXA LUMBAR SPRAIN, INITIAL ENCOUNTER: Primary | ICD-10-CM

## 2020-11-11 NOTE — TELEPHONE ENCOUNTER
Claim [id number]  Shyla [email or web address]   for authorization of approval of Functional Capacity Evaluation cpt code 97816   to be done at [facility] in [address] [facility]  Pt. will be scheduled for ZOË and F.C. E. will be pended.  Will fax clinical not

## 2020-11-12 NOTE — TELEPHONE ENCOUNTER
Claim [id number]  Called JAMES [email or web address]. States   she is recommending and will approve 8 PT sessions; one session twice a week for 4 weeks  while they are in the process of  scheduling him for ZOË.  If  agrees, will need new PT order faxed to h

## 2020-11-12 NOTE — TELEPHONE ENCOUNTER
Faxed PT order to [email or web address] W/C to be done at [facility] in [place].

## 2020-11-12 NOTE — TELEPHONE ENCOUNTER
Received a voicemail from [address] [place] [address], , Tyrone Hutchins who states patient will be sent for an ZOË first before the FCE. Requesting a return call.  Will inform Ritika Rodriguez V for follow up

## 2020-11-23 ENCOUNTER — TELEPHONE (OUTPATIENT)
Dept: NEUROLOGY | Facility: CLINIC | Age: 40
End: 2020-11-23

## 2020-11-23 RX ORDER — CYCLOBENZAPRINE HCL 10 MG
20 TABLET ORAL NIGHTLY
Qty: 60 TABLET | Refills: 0 | Status: SHIPPED | OUTPATIENT
Start: 2020-11-23 | End: 2020-12-22

## 2020-11-23 RX ORDER — MELOXICAM 15 MG/1
15 TABLET ORAL DAILY
Qty: 30 TABLET | Refills: 0 | Status: SHIPPED | OUTPATIENT
Start: 2020-11-23 | End: 2021-04-01 | Stop reason: ALTCHOICE

## 2020-11-23 NOTE — TELEPHONE ENCOUNTER
Medication request: Meloxicam 15mg tabs. Take 1 tab PO daily. LOV: 11/03/2020  NOV: No scheduled office visit     [place] 10/20/2020 #30 Refill 0    Medication request: Cyclobenzaprine 10mg Tab. Take 2 tabs PO nightly.      LOV: 11/03/2020

## 2020-11-23 NOTE — TELEPHONE ENCOUNTER
Received fax from [employer] requesting OV note from 11/03/2020. Faxed OV note and work duty status.

## 2020-11-23 NOTE — TELEPHONE ENCOUNTER
Spoke with patient's wife Clarence Rodriguez who states that patient work note will  on 2020 and needs extension. They are waiting on authorization for the FCE and ZOË. Please advise on EMELYN Paz duty status?

## 2020-12-01 ENCOUNTER — TELEPHONE (OUTPATIENT)
Dept: NEUROLOGY | Facility: CLINIC | Age: 40
End: 2020-12-01

## 2020-12-01 NOTE — TELEPHONE ENCOUNTER
Spoke with patient's wife and notified. Patient's wife verbalized understanding and will  work note today or tomorrow. Note has been placed in   folder.

## 2020-12-14 NOTE — TELEPHONE ENCOUNTER
Left detailed message with [employer] No #[id number] re: status of Physical Therapy and FCE following ZOË    Awaiting follow up

## 2020-12-22 ENCOUNTER — OFFICE VISIT (OUTPATIENT)
Dept: NEUROLOGY | Facility: CLINIC | Age: 40
End: 2020-12-22
Payer: OTHER MISCELLANEOUS

## 2020-12-22 VITALS — WEIGHT: 195 LBS | HEIGHT: 63.5 IN | BODY MASS INDEX: 34.12 KG/M2

## 2020-12-22 DIAGNOSIS — N50.812 PAIN IN LEFT TESTICLE: ICD-10-CM

## 2020-12-22 DIAGNOSIS — S33.5XXA LUMBAR SPRAIN, INITIAL ENCOUNTER: Primary | ICD-10-CM

## 2020-12-22 DIAGNOSIS — G47.00 INSOMNIA, UNSPECIFIED TYPE: ICD-10-CM

## 2020-12-22 PROCEDURE — 3008F BODY MASS INDEX DOCD: CPT | Performed by: PHYSICAL MEDICINE & REHABILITATION

## 2020-12-22 PROCEDURE — 99213 OFFICE O/P EST LOW 20 MIN: CPT | Performed by: PHYSICAL MEDICINE & REHABILITATION

## 2020-12-22 RX ORDER — CYCLOBENZAPRINE HCL 10 MG
20 TABLET ORAL NIGHTLY
Qty: 60 TABLET | Refills: 0 | Status: SHIPPED | OUTPATIENT
Start: 2020-12-22 | End: 2021-01-21

## 2020-12-22 NOTE — PROGRESS NOTES
[address]  Progress Note    CHIEF COMPLAINT:  Patient presents with:  Low Back Pain: Patient presents to follow up on low back problem. Patient states that his symptoms remain th esame.        History of Pre Cancer Mother         brain CA   • Diabetes Mother    • No Known Problems Daughter    • No Known Problems Son        CURRENT MEDICATIONS:   Current Outpatient Medications   Medication Sig Dispense Refill   • cyclobenzaprine 10 MG Oral Tab Take 2 tablets (2 attentive, able to follow 2 step commands, comprehention intact, spontaneous speech intact  Strength: Lower extremities have 5/5 strength  Sensation: Normal lower extremities  Reflexes: Normal lower extremities  SLR: neg           Data     Radiology Imagin learning.         Michelle Cortez MD  Physical Medicine and Rehabilitation/Sports Medicine  [facility]

## 2020-12-22 NOTE — TELEPHONE ENCOUNTER
Spoke to Olivier Zarate who states patient had ZOË on 12/10/20 and nothing will be approved until the results/report is received which may take up to 3-4 weeks. Currently FCE will remain in pending status.     Dr. Manuel Gonzalez notified of the above

## 2020-12-23 ENCOUNTER — TELEPHONE (OUTPATIENT)
Dept: SURGERY | Facility: CLINIC | Age: 40
End: 2020-12-23

## 2020-12-23 NOTE — TELEPHONE ENCOUNTER
Gayle Arnold, nurse  from from [employer] comp calling for mutual patient to request clinical notes from office visit on 10/19/2020 to be  [phone] / Alexander Tilley: Gayle Arnold / Vijay Pickup # [id number]  Thank you.

## 2021-01-05 NOTE — TELEPHONE ENCOUNTER
No auth on file to disclose PHI to [employer]. Reached out to [employer] to send signed [place] for this patient.

## 2021-01-08 ENCOUNTER — TELEPHONE (OUTPATIENT)
Dept: NEUROLOGY | Facility: CLINIC | Age: 41
End: 2021-01-08

## 2021-01-12 NOTE — TELEPHONE ENCOUNTER
Spoke with patient to notify of Dr. Mylene Sun message. Patient verbalized understanding and stated that [facility] will be closing his case and he will be treating with another doctor with his personal insurance.

## 2021-01-12 NOTE — TELEPHONE ENCOUNTER
The ZOË released him to full duty. He should follow with his PMD. Unfortunately I have nothing further to offer him. Pls let him know. Thanks. Artem Torres

## 2021-04-01 ENCOUNTER — OFFICE VISIT (OUTPATIENT)
Dept: FAMILY MEDICINE CLINIC | Facility: CLINIC | Age: 41
End: 2021-04-01
Payer: COMMERCIAL

## 2021-04-01 VITALS
HEART RATE: 79 BPM | DIASTOLIC BLOOD PRESSURE: 70 MMHG | OXYGEN SATURATION: 99 % | HEIGHT: 63.5 IN | SYSTOLIC BLOOD PRESSURE: 100 MMHG | BODY MASS INDEX: 34.47 KG/M2 | WEIGHT: 197 LBS

## 2021-04-01 DIAGNOSIS — Z11.59 NEED FOR HEPATITIS C SCREENING TEST: ICD-10-CM

## 2021-04-01 DIAGNOSIS — R07.9 CHEST PAIN, UNSPECIFIED TYPE: Primary | ICD-10-CM

## 2021-04-01 DIAGNOSIS — Z20.822 ENCOUNTER FOR LABORATORY TESTING FOR COVID-19 VIRUS: ICD-10-CM

## 2021-04-01 DIAGNOSIS — Z00.00 PREVENTATIVE HEALTH CARE: ICD-10-CM

## 2021-04-01 DIAGNOSIS — R73.09 ELEVATED GLUCOSE: ICD-10-CM

## 2021-04-01 PROCEDURE — 3074F SYST BP LT 130 MM HG: CPT | Performed by: INTERNAL MEDICINE

## 2021-04-01 PROCEDURE — 3008F BODY MASS INDEX DOCD: CPT | Performed by: INTERNAL MEDICINE

## 2021-04-01 PROCEDURE — 93000 ELECTROCARDIOGRAM COMPLETE: CPT | Performed by: INTERNAL MEDICINE

## 2021-04-01 PROCEDURE — 3078F DIAST BP <80 MM HG: CPT | Performed by: INTERNAL MEDICINE

## 2021-04-01 PROCEDURE — 99204 OFFICE O/P NEW MOD 45 MIN: CPT | Performed by: INTERNAL MEDICINE

## 2021-04-01 RX ORDER — CLOTRIMAZOLE AND BETAMETHASONE DIPROPIONATE 10; .64 MG/G; MG/G
1 CREAM TOPICAL 2 TIMES DAILY PRN
Qty: 60 G | Refills: 3 | Status: SHIPPED | OUTPATIENT
Start: 2021-04-01

## 2021-04-01 NOTE — PROGRESS NOTES
[facility]  New Patient History and Physical      HPI:   Patient presents with:  Chest Pain: left side      Jess Paulino is a 41year old male presenting for:  . Has  has a past medical history of Hyperlipidemia.      Beau Whiting 10:02 AM    TP 8.0 09/26/2020 10:02 AM    ALB 3.9 09/26/2020 10:02 AM    ALKPHO 72 09/26/2020 10:02 AM    AST 29 09/26/2020 10:02 AM    ALT 74 (H) 09/26/2020 10:02 AM    BILT 0.5 09/26/2020 10:02 AM        Hemoglobin A1C, Microalbumin  No results found for wheezing. Cardiovascular: Positive for chest pain. Negative for palpitations and leg swelling. Gastrointestinal: Negative for abdominal distention, abdominal pain, blood in stool, constipation and nausea.    Endocrine: Negative for cold intolerance, he Pulmonary:      Effort: Pulmonary effort is normal. No respiratory distress. Breath sounds: Normal breath sounds. No wheezing or rales. Chest:      Chest wall: No tenderness.    Abdominal:      General: Bowel sounds are normal. There is no distensi Maintenance:  Annual Physical due on 02/06/2019  Influenza Vaccine(1) Never done  Annual Depression Screen due on 12/26/2020  Pneumococcal Vaccine: Birth to [address] for this Visit:  Requested Prescriptions     Signed Prescrip

## 2021-04-24 ENCOUNTER — LAB ENCOUNTER (OUTPATIENT)
Dept: LAB | Facility: HOSPITAL | Age: 41
End: 2021-04-24
Attending: INTERNAL MEDICINE
Payer: COMMERCIAL

## 2021-04-24 DIAGNOSIS — Z20.822 ENCOUNTER FOR LABORATORY TESTING FOR COVID-19 VIRUS: ICD-10-CM

## 2021-04-27 ENCOUNTER — HOSPITAL ENCOUNTER (OUTPATIENT)
Dept: CV DIAGNOSTICS | Facility: HOSPITAL | Age: 41
Discharge: HOME | End: 2021-04-27
Attending: INTERNAL MEDICINE
Payer: COMMERCIAL

## 2021-04-27 ENCOUNTER — LAB ENCOUNTER (OUTPATIENT)
Dept: LAB | Facility: HOSPITAL | Age: 41
End: 2021-04-27
Attending: INTERNAL MEDICINE
Payer: COMMERCIAL

## 2021-04-27 DIAGNOSIS — R07.9 CHEST PAIN, UNSPECIFIED TYPE: ICD-10-CM

## 2021-04-27 DIAGNOSIS — Z00.00 PREVENTATIVE HEALTH CARE: ICD-10-CM

## 2021-04-27 PROCEDURE — 83036 HEMOGLOBIN GLYCOSYLATED A1C: CPT | Performed by: INTERNAL MEDICINE

## 2021-04-27 PROCEDURE — 80061 LIPID PANEL: CPT | Performed by: INTERNAL MEDICINE

## 2021-04-27 PROCEDURE — 93017 CV STRESS TEST TRACING ONLY: CPT | Performed by: INTERNAL MEDICINE

## 2021-04-27 PROCEDURE — 36415 COLL VENOUS BLD VENIPUNCTURE: CPT | Performed by: INTERNAL MEDICINE

## 2021-04-27 PROCEDURE — 86803 HEPATITIS C AB TEST: CPT | Performed by: INTERNAL MEDICINE

## 2021-04-27 PROCEDURE — 93016 CV STRESS TEST SUPVJ ONLY: CPT | Performed by: INTERNAL MEDICINE

## 2021-04-27 PROCEDURE — 93350 STRESS TTE ONLY: CPT | Performed by: INTERNAL MEDICINE

## 2021-04-27 PROCEDURE — 93018 CV STRESS TEST I&R ONLY: CPT | Performed by: INTERNAL MEDICINE

## 2021-04-27 PROCEDURE — 85025 COMPLETE CBC W/AUTO DIFF WBC: CPT | Performed by: INTERNAL MEDICINE

## 2021-04-27 PROCEDURE — 80053 COMPREHEN METABOLIC PANEL: CPT

## 2021-04-28 ENCOUNTER — TELEPHONE (OUTPATIENT)
Dept: FAMILY MEDICINE CLINIC | Facility: CLINIC | Age: 41
End: 2021-04-28

## 2021-04-28 NOTE — TELEPHONE ENCOUNTER
Results were discussed with patient's wife, he still has intermittent chest pain and was asked to schedule an appointment to be followed up on that, information to get the ultrasound was provided to Danika and she will call to schedule the ultrasound for

## 2021-06-04 ENCOUNTER — HOSPITAL ENCOUNTER (OUTPATIENT)
Dept: ULTRASOUND IMAGING | Facility: HOSPITAL | Age: 41
Discharge: HOME | End: 2021-06-04
Attending: INTERNAL MEDICINE
Payer: COMMERCIAL

## 2021-06-04 DIAGNOSIS — R74.01 ELEVATED ALT MEASUREMENT: ICD-10-CM

## 2021-06-04 PROCEDURE — 76705 ECHO EXAM OF ABDOMEN: CPT | Performed by: INTERNAL MEDICINE

## 2021-06-08 ENCOUNTER — TELEPHONE (OUTPATIENT)
Dept: FAMILY MEDICINE CLINIC | Facility: CLINIC | Age: 41
End: 2021-06-08

## 2021-06-08 NOTE — TELEPHONE ENCOUNTER
Results were given to his wife Danika and she verbalized understanding, she will inform patient of his results. ----- Message from Jesús Cortez MD sent at 6/7/2021  4:17 AM CDT -----  Inform of US result that shows hepatic steatosis.   Increase co

## 2021-06-17 ENCOUNTER — OFFICE VISIT (OUTPATIENT)
Dept: FAMILY MEDICINE CLINIC | Facility: CLINIC | Age: 41
End: 2021-06-17
Payer: COMMERCIAL

## 2021-06-17 VITALS
SYSTOLIC BLOOD PRESSURE: 124 MMHG | OXYGEN SATURATION: 98 % | DIASTOLIC BLOOD PRESSURE: 60 MMHG | WEIGHT: 195 LBS | HEART RATE: 74 BPM | BODY MASS INDEX: 34 KG/M2

## 2021-06-17 DIAGNOSIS — K76.0 HEPATIC STEATOSIS: Primary | ICD-10-CM

## 2021-06-17 DIAGNOSIS — M54.9 BACK PAIN, UNSPECIFIED BACK LOCATION, UNSPECIFIED BACK PAIN LATERALITY, UNSPECIFIED CHRONICITY: ICD-10-CM

## 2021-06-17 PROCEDURE — 3074F SYST BP LT 130 MM HG: CPT | Performed by: INTERNAL MEDICINE

## 2021-06-17 PROCEDURE — 3078F DIAST BP <80 MM HG: CPT | Performed by: INTERNAL MEDICINE

## 2021-06-17 PROCEDURE — 99214 OFFICE O/P EST MOD 30 MIN: CPT | Performed by: INTERNAL MEDICINE

## 2021-06-17 RX ORDER — NAPROXEN 500 MG/1
500 TABLET ORAL 2 TIMES DAILY WITH MEALS
Qty: 60 TABLET | Refills: 0 | Status: SHIPPED | OUTPATIENT
Start: 2021-06-17 | End: 2021-07-21

## 2021-06-17 RX ORDER — METHOCARBAMOL 500 MG/1
500 TABLET, FILM COATED ORAL 2 TIMES DAILY PRN
Qty: 60 TABLET | Refills: 0 | Status: SHIPPED | OUTPATIENT
Start: 2021-06-17 | End: 2021-07-21

## 2021-06-17 NOTE — PROGRESS NOTES
[facility]  Return Patient       HPI:       Clifton Odonnell is a 41year old male presenting for:  . Has  has a past medical history of Hyperlipidemia. Here for follow up. Had stress test done since last visit.   Chest pain 10-year ASCVD risk score (Didi Manzano et al., 2013) is: 1.7%    Values used to calculate the score:      Age: 41 years      Sex: Male      Is Non- : No      Diabetic: No      Tobacco smoker: No      Systolic Blood Pressure: 973 mmH for dysuria, hematuria and urgency. Musculoskeletal: Negative for arthralgias, back pain, gait problem, joint swelling, myalgias and neck pain. Skin: Negative for rash and wound.    Allergic/Immunologic: Negative for food allergies and immunocompromised tenderness. There is no guarding or rebound. Musculoskeletal:         General: No tenderness. Normal range of motion. Cervical back: Normal range of motion. Lymphadenopathy:      Cervical: No cervical adenopathy. Skin:     General: Skin is warm. or changing symptoms as well as any side effects or complications from the treatments . Red flags/ ER precautions discussed.       Lani Manzano MD  Internal Medicine/Primary Care  [place] [id number]

## 2021-07-21 ENCOUNTER — OFFICE VISIT (OUTPATIENT)
Dept: FAMILY MEDICINE CLINIC | Facility: CLINIC | Age: 41
End: 2021-07-21
Payer: COMMERCIAL

## 2021-07-21 VITALS
WEIGHT: 193 LBS | DIASTOLIC BLOOD PRESSURE: 80 MMHG | HEART RATE: 79 BPM | OXYGEN SATURATION: 97 % | SYSTOLIC BLOOD PRESSURE: 116 MMHG | BODY MASS INDEX: 34 KG/M2

## 2021-07-21 DIAGNOSIS — M54.50 LUMBAR BACK PAIN: Primary | ICD-10-CM

## 2021-07-21 PROCEDURE — 3074F SYST BP LT 130 MM HG: CPT | Performed by: INTERNAL MEDICINE

## 2021-07-21 PROCEDURE — 3079F DIAST BP 80-89 MM HG: CPT | Performed by: INTERNAL MEDICINE

## 2021-07-21 PROCEDURE — 99214 OFFICE O/P EST MOD 30 MIN: CPT | Performed by: INTERNAL MEDICINE

## 2021-07-21 RX ORDER — MELOXICAM 15 MG/1
15 TABLET ORAL DAILY
Qty: 30 TABLET | Refills: 1 | Status: SHIPPED | OUTPATIENT
Start: 2021-07-21

## 2021-07-21 RX ORDER — METHYLPREDNISOLONE 4 MG/1
TABLET ORAL
Qty: 1 EACH | Refills: 0 | Status: SHIPPED | OUTPATIENT
Start: 2021-07-21

## 2021-07-21 RX ORDER — TIZANIDINE HYDROCHLORIDE 2 MG/1
2 CAPSULE, GELATIN COATED ORAL NIGHTLY PRN
Qty: 30 CAPSULE | Refills: 3 | Status: SHIPPED | OUTPATIENT
Start: 2021-07-21

## 2021-07-21 RX ORDER — NAPROXEN 500 MG/1
500 TABLET ORAL 2 TIMES DAILY WITH MEALS
Qty: 60 TABLET | Refills: 0 | Status: SHIPPED | OUTPATIENT
Start: 2021-07-21

## 2021-07-24 NOTE — PROGRESS NOTES
[facility]  Return Patient       HPI:   Chief Compaint: Back pain    Stefani English is a 35year old male presenting for:  . Has  has a past medical history of Hyperlipidemia. Here for lower lumbar back pain.   Started abou 04/27/2021 09:23 AM    NONHDLC 167 (H) 04/27/2021 09:23 AM     The 10-year ASCVD risk score (Cal Ding, et al., 2013) is: 1.7%    Values used to calculate the score:      Age: 35 years      Sex: Male      Is Non- : No      Diabeti palpitations and leg swelling. Gastrointestinal: Negative for abdominal distention, abdominal pain, blood in stool, constipation and nausea. Endocrine: Negative for cold intolerance, heat intolerance and polyuria.    Genitourinary: Negative for dysuria, wall: No tenderness. Abdominal:      General: Bowel sounds are normal. There is no distension. Palpations: Abdomen is soft. There is no mass. Tenderness: There is no abdominal tenderness. There is no guarding or rebound.    Musculoskeletal: about 6 weeks (around 9/1/2021). Important issues to follow up on next visit      Patient indicates understanding of the above recommendations and agrees to the above plan. Reasurrance and education provided. All questions answered.   Notified to call wit

## (undated) NOTE — LETTER
09/15/20          Kristie Bean  :  1980      To Whom It May Concern:    Work Duty Status:   Work Status: Off Work  Disposition:                Restricted Until: Date of Next of Visit               Follow-Up Date: 2020     If this offic

## (undated) NOTE — LETTER
Umer Dub 35   Date:   12/22/2020     Name:   Cayetano Oleary    YOB: 1980   MRN:   [id number]       WHERE IS YOUR PAIN NOW? Obey the areas on your body where you feel the described sensations.   Use the appropri

## (undated) NOTE — LETTER
10/20/20          Jess Paulino  :  1980      To Whom It May Concern: This patient was seen in our office on 10/20/20 .       Work Duty Status:   Work Related: Yes  MMI:No  Work Status: Modified Work with Restrictions: (Note: If these rest

## (undated) NOTE — LETTER
Umer Dub 35   Date:   9/25/2020     Name:   Kevin Medina    YOB: 1980   MRN:   [id number]       WHERE IS YOUR PAIN NOW? Obey the areas on your body where you feel the described sensations.   Use the appropria

## (undated) NOTE — LETTER
20      Adrienne Dire  :  1980      To Whom It May Concern: This patient was seen in our office on 20.       Duty status:  Work Related: Yes  MMI:No  Work Status: Modified Work with Restrictions: (Note: If these restrictions ar

## (undated) NOTE — LETTER
20          Kyra Vann  :  1980      To Whom It May Concern: This patient was seen in our office on 20 .       Work Duty Status:   Work Related: Yes  MMI:No  Work Status: Off Work  Disposition:    Return to Work Date:

## (undated) NOTE — LETTER
Date: 1/9/2018    Patient Name: Frankey         To Whom it may concern: This letter has been written at the patient's request. The above patient was seen at the [facility] for treatment of a medical condition.     This patient s

## (undated) NOTE — LETTER
Umer Dub 35   Date:   11/3/2020     Name:   Cayetano Oleary    YOB: 1980   MRN:   [id number]       WHERE IS YOUR PAIN NOW? Obey the areas on your body where you feel the described sensations.   Use the appropria

## (undated) NOTE — LETTER
20          Ben Roles  :  1980      To Whom It May Concern: This patient was seen in our office on 20 .       Work Related: Yes  MMI:No  Work Status: Modified Work with Restrictions: (Note: If these restrictions are unavail

## (undated) NOTE — LETTER
Umer Dub 35   Date:   9/9/2020     Name:   Jarett Aguilar    YOB: 1980   MRN:   [id number]       WHERE IS YOUR PAIN NOW? Obey the areas on your body where you feel the described sensations.   Use the appropriat

## (undated) NOTE — LETTER
Umer Dub 35   Date:   10/20/2020     Name:   Evie Tejeda    YOB: 1980   MRN:   [id number]       WHERE IS YOUR PAIN NOW? Obey the areas on your body where you feel the described sensations.   Use the appropri

## (undated) NOTE — Clinical Note
Dear Terri Pierre,    Thank you for sending Obie Nageotte to see me for physiatry consultation. I appreciate your confidence in me to care for your patients. Please feel free call me with any questions at [phone] or contact me through [facility]     [place]

## (undated) NOTE — LETTER
20          Frankey   :  1980      To Whom It May Concern:    Duty status:  Work Related: Yes  MMI:No  Work Status: Modified Work with Restrictions: (Note: If these restrictions are unavailable, please consider the patient to be o

## (undated) NOTE — LETTER
20          Kristie Bean  :  1980      To Whom It May Concern: This patient was seen in our office on 20 .       Work Duty Status:   Work Status: Off Work  Disposition:    Restricted Until: Date of Next of Visit   Follow-Up Da